# Patient Record
Sex: MALE | Race: ASIAN | URBAN - METROPOLITAN AREA
[De-identification: names, ages, dates, MRNs, and addresses within clinical notes are randomized per-mention and may not be internally consistent; named-entity substitution may affect disease eponyms.]

---

## 2018-07-10 ENCOUNTER — EMERGENCY (EMERGENCY)
Facility: HOSPITAL | Age: 63
LOS: 1 days | Discharge: HOME | End: 2018-07-10
Admitting: EMERGENCY MEDICINE
Payer: OTHER MISCELLANEOUS

## 2018-07-10 VITALS
RESPIRATION RATE: 18 BRPM | TEMPERATURE: 98 F | OXYGEN SATURATION: 98 % | HEART RATE: 82 BPM | DIASTOLIC BLOOD PRESSURE: 77 MMHG | SYSTOLIC BLOOD PRESSURE: 125 MMHG

## 2018-07-10 PROCEDURE — 12041 INTMD RPR N-HF/GENIT 2.5CM/<: CPT

## 2018-07-10 PROCEDURE — 99283 EMERGENCY DEPT VISIT LOW MDM: CPT | Mod: 25

## 2018-07-10 RX ORDER — TETANUS TOXOID, REDUCED DIPHTHERIA TOXOID AND ACELLULAR PERTUSSIS VACCINE, ADSORBED 5; 2.5; 8; 8; 2.5 [IU]/.5ML; [IU]/.5ML; UG/.5ML; UG/.5ML; UG/.5ML
0.5 SUSPENSION INTRAMUSCULAR ONCE
Qty: 0 | Refills: 0 | Status: COMPLETED | OUTPATIENT
Start: 2018-07-10 | End: 2018-07-10

## 2018-07-10 RX ADMIN — TETANUS TOXOID, REDUCED DIPHTHERIA TOXOID AND ACELLULAR PERTUSSIS VACCINE, ADSORBED 0.5 MILLILITER(S): 5; 2.5; 8; 8; 2.5 SUSPENSION INTRAMUSCULAR at 23:01

## 2018-07-10 NOTE — ED PROVIDER NOTE - OBJECTIVE STATEMENT
61 y/o male pmh htn c/o laceration to R hand x today. PT admits to working with a saw at work and cut his hand by accident. Pt admits to bleeding and pain. Pt went to urgent care and was sent to ER for deep laceration concerning for tendon involvement. Pt denies numbness, tingling, weakness, redness, fever or chills.

## 2018-07-10 NOTE — ED ADULT TRIAGE NOTE - CHIEF COMPLAINT QUOTE
pt. states he cut his R hand while working w/ a , no bleeding noted in triage, sent by urgent care d/t depth of laceration and possible tendon involvement.

## 2018-07-10 NOTE — ED PROVIDER NOTE - PHYSICAL EXAMINATION
R hand- dorsal, just proximal to 2nd MCP joint- 1.5cm linear lac deep to tendon. no tendon lac observed. FROM of all 5 digits. 2nd finger has full flexion and extension, 5/5 strength against resistance, distal sensation intact.

## 2018-07-16 ENCOUNTER — EMERGENCY (EMERGENCY)
Facility: HOSPITAL | Age: 63
LOS: 1 days | Discharge: HOME | End: 2018-07-16
Admitting: EMERGENCY MEDICINE

## 2018-07-16 VITALS
OXYGEN SATURATION: 100 % | DIASTOLIC BLOOD PRESSURE: 89 MMHG | SYSTOLIC BLOOD PRESSURE: 143 MMHG | RESPIRATION RATE: 16 BRPM | HEART RATE: 79 BPM | TEMPERATURE: 98 F

## 2018-07-16 NOTE — ED PROVIDER NOTE - SKIN WOUND DESCRIPTION
R hand +1cm laceration overlying dorsum of 2nd MCP joint. No swelling, no drainage, wound healing well. Full ROM to hand and fingers.

## 2018-07-16 NOTE — ED PROCEDURE NOTE - CPROC ED SUTURE APPEARANCE1
clean/healed/Wound well healing, well appearing. No discharge, drainage, redness, or signs of dehiscence.

## 2018-07-16 NOTE — ED PROVIDER NOTE - OBJECTIVE STATEMENT
61y/o M with PMHx of DM, HTN, HLD, presents to the ED for suture removal. Pt s/p laceration 7d ago to the dorsum of his R hand while cutting sheet metal at work. Pt sustained laceration to dorsum of R 2nd MCP joint, 4 sutures were placed. Pt states wound healing well. Denies redness, swelling, pain, bleeding, discharge, any other complaints. Allergic to IV contrast, Oxycodone, Penicillin.

## 2018-07-17 PROBLEM — I10 ESSENTIAL (PRIMARY) HYPERTENSION: Chronic | Status: ACTIVE | Noted: 2018-07-10

## 2021-08-22 NOTE — ED PROVIDER NOTE - DISPOSITION TYPE
Hypertensive crisis on arrival resolved with pain control  Continue metoprolol succinate 100 mg daily  Trend blood pressures DISCHARGE

## 2023-09-20 ENCOUNTER — OFFICE (OUTPATIENT)
Dept: URBAN - METROPOLITAN AREA CLINIC 90 | Facility: CLINIC | Age: 68
Setting detail: OPHTHALMOLOGY
End: 2023-09-20
Payer: MEDICARE

## 2023-09-20 ENCOUNTER — RX ONLY (RX ONLY)
Age: 68
End: 2023-09-20

## 2023-09-20 DIAGNOSIS — H25.13: ICD-10-CM

## 2023-09-20 DIAGNOSIS — H40.053: ICD-10-CM

## 2023-09-20 DIAGNOSIS — H18.413: ICD-10-CM

## 2023-09-20 DIAGNOSIS — D31.02: ICD-10-CM

## 2023-09-20 DIAGNOSIS — H52.4: ICD-10-CM

## 2023-09-20 PROCEDURE — 76514 ECHO EXAM OF EYE THICKNESS: CPT | Performed by: OPHTHALMOLOGY

## 2023-09-20 PROCEDURE — 92004 COMPRE OPH EXAM NEW PT 1/>: CPT | Performed by: OPHTHALMOLOGY

## 2023-09-20 PROCEDURE — 92015 DETERMINE REFRACTIVE STATE: CPT | Performed by: OPHTHALMOLOGY

## 2023-09-20 ASSESSMENT — PACHYMETRY
OS_CT_CORRECTION: -1
OD_CT_UM: 572
OD_CT_CORRECTION: -2
OS_CT_UM: 565

## 2023-09-20 ASSESSMENT — REFRACTION_MANIFEST
OS_AXIS: 010
OS_VA1: 20/40+
OS_CYLINDER: -0.50
OD_SPHERE: -2.00
OD_ADD: +2.50
OS_ADD: +2.50
OS_SPHERE: +1.50
OD_CYLINDER: -1.00
OD_VA1: 20/20 (SLOW)
OD_AXIS: 075

## 2023-09-20 ASSESSMENT — AXIALLENGTH_DERIVED
OS_AL: 22.8103
OD_AL: 24.2631
OD_AL: 22.5404
OS_AL: 22.7649

## 2023-09-20 ASSESSMENT — REFRACTION_AUTOREFRACTION
OD_CYLINDER: -1.00
OS_SPHERE: +2.00
OD_AXIS: 076
OS_CYLINDER: -1.25
OS_AXIS: 010
OD_SPHERE: +2.50

## 2023-09-20 ASSESSMENT — SPHEQUIV_DERIVED
OD_SPHEQUIV: 2
OS_SPHEQUIV: 1.375
OS_SPHEQUIV: 1.25
OD_SPHEQUIV: -2.5

## 2023-09-20 ASSESSMENT — KERATOMETRY
OD_AXISANGLE_DEGREES: 135
OD_K1POWER_DIOPTERS: 43.75
OD_K2POWER_DIOPTERS: 45.00
OS_K1POWER_DIOPTERS: 43.75
OS_K2POWER_DIOPTERS: 45.00
OS_AXISANGLE_DEGREES: 073

## 2023-09-20 ASSESSMENT — VISUAL ACUITY
OD_BCVA: 20/50
OS_BCVA: 20/60

## 2023-09-20 ASSESSMENT — CONFRONTATIONAL VISUAL FIELD TEST (CVF)
OS_FINDINGS: FULL
OD_FINDINGS: FULL

## 2023-10-24 ENCOUNTER — OFFICE (OUTPATIENT)
Dept: URBAN - METROPOLITAN AREA CLINIC 90 | Facility: CLINIC | Age: 68
Setting detail: OPHTHALMOLOGY
End: 2023-10-24
Payer: MEDICARE

## 2023-10-24 DIAGNOSIS — H52.4: ICD-10-CM

## 2023-10-24 PROBLEM — H52.7 REFRACTIVE ERROR: Status: ACTIVE | Noted: 2023-10-24

## 2023-10-24 PROCEDURE — RX/CHECK RX/CHECK: Performed by: OPHTHALMOLOGY

## 2023-10-24 ASSESSMENT — REFRACTION_CURRENTRX
OD_ADD: +2.25
OS_CYLINDER: -0.50
OS_OVR_VA: 20/
OD_OVR_VA: 20/
OS_AXIS: 009
OD_VPRISM_DIRECTION: PROGS
OS_ADD: +2.25
OS_VPRISM_DIRECTION: PROGS
OD_CYLINDER: -1.00
OD_AXIS: 074
OD_SPHERE: -2.00
OS_SPHERE: +1.50

## 2023-10-24 ASSESSMENT — REFRACTION_MANIFEST
OD_AXIS: 075
OS_SPHERE: +1.50
OD_ADD: +2.50
OS_VA1: 20/40+
OS_SPHERE: +1.75
OS_AXIS: 010
OS_ADD: +2.50
OS_ADD: +2.50
OD_CYLINDER: -1.00
OS_AXIS: 010
OS_VA1: 20/25
OS_CYLINDER: -0.50
OD_AXIS: 075
OD_VA1: 20/20
OD_ADD: +2.50
OD_SPHERE: -2.00
OD_SPHERE: +2.00
OD_VA1: 20/20 (SLOW)
OS_CYLINDER: -1.50
OD_CYLINDER: -1.00

## 2023-10-24 ASSESSMENT — KERATOMETRY
OS_AXISANGLE_DEGREES: 076
OD_AXISANGLE_DEGREES: 129
OS_K1POWER_DIOPTERS: 43.75
OD_K1POWER_DIOPTERS: 44.00
OD_K2POWER_DIOPTERS: 45.25
OS_K2POWER_DIOPTERS: 45.25

## 2023-10-24 ASSESSMENT — SPHEQUIV_DERIVED
OD_SPHEQUIV: 2
OD_SPHEQUIV: -2.5
OS_SPHEQUIV: 1
OD_SPHEQUIV: 1.5
OS_SPHEQUIV: 1
OS_SPHEQUIV: 1.25

## 2023-10-24 ASSESSMENT — CONFRONTATIONAL VISUAL FIELD TEST (CVF)
OS_FINDINGS: FULL
OD_FINDINGS: FULL

## 2023-10-24 ASSESSMENT — VISUAL ACUITY
OS_BCVA: 20/70
OD_BCVA: 20/50

## 2023-10-24 ASSESSMENT — AXIALLENGTH_DERIVED
OS_AL: 22.7674
OD_AL: 24.1663
OS_AL: 22.8585
OD_AL: 22.4568
OD_AL: 22.6348
OS_AL: 22.8585

## 2023-10-24 ASSESSMENT — REFRACTION_AUTOREFRACTION
OD_CYLINDER: -1.00
OS_AXIS: 013
OD_SPHERE: +2.50
OS_SPHERE: +1.75
OD_AXIS: 077
OS_CYLINDER: -1.50

## 2023-12-06 NOTE — ED PROCEDURE NOTE - NS ED SCRIBE STATEMENT
Take 50 mg daily prednisone.  If improved go to 25 mg next weekt.  Follow up here when back from [place].    Have labs done and I will call results.    Go to er is symptoms worsen   ACP